# Patient Record
Sex: MALE | Race: WHITE | NOT HISPANIC OR LATINO | Employment: FULL TIME | URBAN - METROPOLITAN AREA
[De-identification: names, ages, dates, MRNs, and addresses within clinical notes are randomized per-mention and may not be internally consistent; named-entity substitution may affect disease eponyms.]

---

## 2018-12-20 ENCOUNTER — OFFICE VISIT (OUTPATIENT)
Dept: URGENT CARE | Facility: CLINIC | Age: 28
End: 2018-12-20
Payer: COMMERCIAL

## 2018-12-20 VITALS
BODY MASS INDEX: 30.79 KG/M2 | RESPIRATION RATE: 18 BRPM | DIASTOLIC BLOOD PRESSURE: 78 MMHG | HEART RATE: 93 BPM | HEIGHT: 75 IN | WEIGHT: 247.6 LBS | SYSTOLIC BLOOD PRESSURE: 133 MMHG | TEMPERATURE: 97 F | OXYGEN SATURATION: 95 %

## 2018-12-20 DIAGNOSIS — H66.92 LEFT OTITIS MEDIA, UNSPECIFIED OTITIS MEDIA TYPE: Primary | ICD-10-CM

## 2018-12-20 PROCEDURE — G0382 LEV 3 HOSP TYPE B ED VISIT: HCPCS | Performed by: PHYSICIAN ASSISTANT

## 2018-12-20 RX ORDER — AMOXICILLIN AND CLAVULANATE POTASSIUM 875; 125 MG/1; MG/1
1 TABLET, FILM COATED ORAL EVERY 12 HOURS SCHEDULED
Qty: 20 TABLET | Refills: 0 | Status: SHIPPED | OUTPATIENT
Start: 2018-12-20 | End: 2018-12-30

## 2018-12-20 NOTE — PROGRESS NOTES
[address]        NAME: Marylen Binet is a 34 y o  male  : 1990    MRN: [id number]  DATE: 2018  TIME: 5:06 PM    Assessment and Plan   Left otitis media, unspecified otitis media type [H66 92]  1  Left otitis media, unspecified otitis media type  amoxicillin-clavulanate (AUGMENTIN) 875-125 mg per tablet     Patient Instructions     Take antibiotic as prescribed  Follow up with PCP in 3-5 days  Proceed to  ER if symptoms worsen  Chief Complaint     Chief Complaint   Patient presents with   Loyce Mantis     left ear pain started this moring, also have cold like symptoms since Monday     History of Present Illness     Earache    There is pain in the left ear  This is a new problem  The current episode started today  The problem occurs constantly  The problem has been gradually worsening  The maximum temperature recorded prior to his arrival was 100 4 - 100 9 F  The pain is moderate  Associated symptoms include coughing, rhinorrhea and a sore throat  Pertinent negatives include no abdominal pain, diarrhea, ear discharge, headaches, hearing loss, neck pain, rash or vomiting  He has tried nothing for the symptoms  There is no history of a chronic ear infection, hearing loss or a tympanostomy tube  Review of Systems   Review of Systems   Constitutional: Negative for activity change, appetite change, chills, diaphoresis, fatigue, fever and unexpected weight change  HENT: Positive for congestion, ear pain, rhinorrhea and sore throat  Negative for dental problem, drooling, ear discharge, facial swelling and hearing loss  Respiratory: Positive for cough  Negative for apnea, choking, chest tightness, shortness of breath, wheezing and stridor  Gastrointestinal: Negative for abdominal pain, diarrhea and vomiting  Musculoskeletal: Negative for neck pain  Skin: Negative for rash  Neurological: Negative for headaches           Current Medications       Current Outpatient Prescriptions:     amoxicillin-clavulanate (AUGMENTIN) 875-125 mg per tablet, Take 1 tablet by mouth every 12 (twelve) hours for 10 days, Disp: 20 tablet, Rfl: 0    Current Allergies     Allergies as of 12/20/2018    (No Known Allergies)            The following portions of the patient's history were reviewed and updated as appropriate: allergies, current medications, past family history, past medical history, past social history, past surgical history and problem list      Past Medical History:   Diagnosis Date    Asthma        History reviewed  No pertinent surgical history  History reviewed  No pertinent family history  Medications have been verified  Objective   /78   Pulse 93   Temp (!) 97 °F (36 1 °C)   Resp 18   Ht 6' 3" (1 905 m)   Wt 112 kg (247 lb 9 6 oz)   SpO2 95%   BMI 30 95 kg/m²        Physical Exam     Physical Exam   Constitutional: He appears well-developed and well-nourished  HENT:   Head: Normocephalic  Right Ear: Hearing, tympanic membrane, external ear and ear canal normal  Tympanic membrane is not erythematous, not retracted and not bulging  Left Ear: Hearing, external ear and ear canal normal  Tympanic membrane is erythematous and bulging  Nose: Mucosal edema and rhinorrhea present  Mouth/Throat: Posterior oropharyngeal erythema present  No oropharyngeal exudate or posterior oropharyngeal edema  Cardiovascular: Normal rate, regular rhythm, normal heart sounds and intact distal pulses  Exam reveals no gallop and no friction rub  No murmur heard  Pulmonary/Chest: Effort normal and breath sounds normal  No respiratory distress  He has no decreased breath sounds  He has no wheezes  He has no rhonchi  He has no rales  Abdominal: Soft  Bowel sounds are normal  He exhibits no distension  There is no tenderness  There is no rebound and no guarding  Lymphadenopathy:     He has cervical adenopathy          Right cervical: Superficial cervical adenopathy present  Left cervical: Superficial cervical adenopathy present

## 2018-12-20 NOTE — LETTER
December 20, 2018     Patient: Eric Ramos   YOB: 1990   Date of Visit: 12/20/2018       To Whom it May Concern:    Eric Ramos was seen in my clinic on 12/20/2018  He may return to work on December 22, 2018  If you have any questions or concerns, please don't hesitate to call           Sincerely,          Cathleen Kraus PA-C        CC: No Recipients

## 2023-10-29 ENCOUNTER — HOSPITAL ENCOUNTER (EMERGENCY)
Facility: HOSPITAL | Age: 33
Discharge: HOME | End: 2023-10-29
Attending: EMERGENCY MEDICINE
Payer: COMMERCIAL

## 2023-10-29 ENCOUNTER — APPOINTMENT (EMERGENCY)
Dept: CT IMAGING | Facility: HOSPITAL | Age: 33
End: 2023-10-29
Payer: COMMERCIAL

## 2023-10-29 VITALS
WEIGHT: 225.97 LBS | SYSTOLIC BLOOD PRESSURE: 125 MMHG | RESPIRATION RATE: 18 BRPM | TEMPERATURE: 98.4 F | BODY MASS INDEX: 28.24 KG/M2 | DIASTOLIC BLOOD PRESSURE: 70 MMHG | HEART RATE: 95 BPM | OXYGEN SATURATION: 98 %

## 2023-10-29 DIAGNOSIS — K59.01 SLOW TRANSIT CONSTIPATION: Primary | ICD-10-CM

## 2023-10-29 LAB
ALBUMIN SERPL BCP-MCNC: 4.1 G/DL (ref 3.5–5)
ALP SERPL-CCNC: 86 U/L (ref 34–104)
ALT SERPL W P-5'-P-CCNC: 44 U/L (ref 7–52)
ANION GAP SERPL CALCULATED.3IONS-SCNC: 8 MMOL/L
AST SERPL W P-5'-P-CCNC: 24 U/L (ref 13–39)
BASOPHILS # BLD AUTO: 0.06 THOUSANDS/ÂΜL (ref 0–0.1)
BASOPHILS NFR BLD AUTO: 0 % (ref 0–1)
BILIRUB SERPL-MCNC: 0.51 MG/DL (ref 0.2–1)
BUN SERPL-MCNC: 17 MG/DL (ref 5–25)
CALCIUM SERPL-MCNC: 9.2 MG/DL (ref 8.4–10.2)
CHLORIDE SERPL-SCNC: 100 MMOL/L (ref 96–108)
CO2 SERPL-SCNC: 27 MMOL/L (ref 21–32)
CREAT SERPL-MCNC: 1.06 MG/DL (ref 0.6–1.3)
EOSINOPHIL # BLD AUTO: 0.26 THOUSAND/ÂΜL (ref 0–0.61)
EOSINOPHIL NFR BLD AUTO: 2 % (ref 0–6)
ERYTHROCYTE [DISTWIDTH] IN BLOOD BY AUTOMATED COUNT: 12.2 % (ref 11.6–15.1)
GFR SERPL CREATININE-BSD FRML MDRD: 93 ML/MIN/1.73SQ M
GLUCOSE SERPL-MCNC: 97 MG/DL (ref 65–140)
HCT VFR BLD AUTO: 40 % (ref 36.5–49.3)
HGB BLD-MCNC: 13 G/DL (ref 12–17)
IMM GRANULOCYTES # BLD AUTO: 0.1 THOUSAND/UL (ref 0–0.2)
IMM GRANULOCYTES NFR BLD AUTO: 1 % (ref 0–2)
LIPASE SERPL-CCNC: 8 U/L (ref 11–82)
LYMPHOCYTES # BLD AUTO: 2.9 THOUSANDS/ÂΜL (ref 0.6–4.47)
LYMPHOCYTES NFR BLD AUTO: 20 % (ref 14–44)
MCH RBC QN AUTO: 28.1 PG (ref 26.8–34.3)
MCHC RBC AUTO-ENTMCNC: 32.5 G/DL (ref 31.4–37.4)
MCV RBC AUTO: 86 FL (ref 82–98)
MONOCYTES # BLD AUTO: 1.22 THOUSAND/ÂΜL (ref 0.17–1.22)
MONOCYTES NFR BLD AUTO: 8 % (ref 4–12)
NEUTROPHILS # BLD AUTO: 10.37 THOUSANDS/ÂΜL (ref 1.85–7.62)
NEUTS SEG NFR BLD AUTO: 69 % (ref 43–75)
NRBC BLD AUTO-RTO: 0 /100 WBCS
PLATELET # BLD AUTO: 386 THOUSANDS/UL (ref 149–390)
PMV BLD AUTO: 9.1 FL (ref 8.9–12.7)
POTASSIUM SERPL-SCNC: 3.9 MMOL/L (ref 3.5–5.3)
PROT SERPL-MCNC: 7.8 G/DL (ref 6.4–8.4)
RBC # BLD AUTO: 4.63 MILLION/UL (ref 3.88–5.62)
SODIUM SERPL-SCNC: 135 MMOL/L (ref 135–147)
WBC # BLD AUTO: 14.91 THOUSAND/UL (ref 4.31–10.16)

## 2023-10-29 PROCEDURE — 36415 COLL VENOUS BLD VENIPUNCTURE: CPT | Performed by: EMERGENCY MEDICINE

## 2023-10-29 PROCEDURE — G1004 CDSM NDSC: HCPCS

## 2023-10-29 PROCEDURE — 80053 COMPREHEN METABOLIC PANEL: CPT | Performed by: EMERGENCY MEDICINE

## 2023-10-29 PROCEDURE — 74177 CT ABD & PELVIS W/CONTRAST: CPT

## 2023-10-29 PROCEDURE — 83690 ASSAY OF LIPASE: CPT | Performed by: EMERGENCY MEDICINE

## 2023-10-29 PROCEDURE — 96361 HYDRATE IV INFUSION ADD-ON: CPT

## 2023-10-29 PROCEDURE — 99285 EMERGENCY DEPT VISIT HI MDM: CPT | Performed by: EMERGENCY MEDICINE

## 2023-10-29 PROCEDURE — 85025 COMPLETE CBC W/AUTO DIFF WBC: CPT | Performed by: EMERGENCY MEDICINE

## 2023-10-29 PROCEDURE — 96374 THER/PROPH/DIAG INJ IV PUSH: CPT

## 2023-10-29 PROCEDURE — 99284 EMERGENCY DEPT VISIT MOD MDM: CPT

## 2023-10-29 RX ORDER — FAMOTIDINE 10 MG/ML
20 INJECTION, SOLUTION INTRAVENOUS ONCE
Status: COMPLETED | OUTPATIENT
Start: 2023-10-29 | End: 2023-10-29

## 2023-10-29 RX ORDER — POLYETHYLENE GLYCOL 3350 17 G/17G
17 POWDER, FOR SOLUTION ORAL DAILY
Qty: 510 G | Refills: 0 | Status: SHIPPED | OUTPATIENT
Start: 2023-10-29 | End: 2023-11-28

## 2023-10-29 RX ORDER — MAGNESIUM HYDROXIDE/ALUMINUM HYDROXICE/SIMETHICONE 120; 1200; 1200 MG/30ML; MG/30ML; MG/30ML
30 SUSPENSION ORAL ONCE
Status: COMPLETED | OUTPATIENT
Start: 2023-10-29 | End: 2023-10-29

## 2023-10-29 RX ORDER — METOCLOPRAMIDE 10 MG/1
10 TABLET ORAL EVERY 6 HOURS
Qty: 30 TABLET | Refills: 0 | Status: SHIPPED | OUTPATIENT
Start: 2023-10-29

## 2023-10-29 RX ADMIN — ALUMINUM HYDROXIDE, MAGNESIUM HYDROXIDE, AND DIMETHICONE 30 ML: 200; 20; 200 SUSPENSION ORAL at 19:31

## 2023-10-29 RX ADMIN — FAMOTIDINE 20 MG: 10 INJECTION, SOLUTION INTRAVENOUS at 19:31

## 2023-10-29 RX ADMIN — SODIUM CHLORIDE 1000 ML: 0.9 INJECTION, SOLUTION INTRAVENOUS at 19:32

## 2023-10-29 RX ADMIN — IOHEXOL 100 ML: 350 INJECTION, SOLUTION INTRAVENOUS at 20:20

## 2023-10-29 NOTE — ED PROVIDER NOTES
History  Chief Complaint   Patient presents with    Abdominal Pain     Started with abdominal pain yesterday, stated at times it goes into his back and feels like pressure. Stated has not been feeling well all week, has had a fever and diarrhea. Patient complains of 1-2 days of abdominal pain with some preceding diarrhea and subjective fevers. Points to the left lower quadrant. Some nausea but no vomiting. Patient states he uses occasional marijuana and methamphetamine and drinks approximately 25 drinks a week. History provided by:  Patient   used: No    Abdominal Pain  Pain location:  LLQ  Pain quality: aching and cramping    Pain radiates to:  Does not radiate  Pain severity:  Moderate  Onset quality:  Gradual  Duration:  2 days  Timing:  Constant  Progression:  Unchanged  Chronicity:  New  Relieved by:  Nothing  Worsened by:  Nothing  Ineffective treatments:  None tried  Associated symptoms: diarrhea, fever and nausea    Associated symptoms: no chest pain, no chills, no constipation, no cough, no dysuria, no hematemesis, no hematochezia, no hematuria, no melena, no shortness of breath, no sore throat and no vomiting        None       Past Medical History:   Diagnosis Date    Asthma        History reviewed. No pertinent surgical history. History reviewed. No pertinent family history. I have reviewed and agree with the history as documented. E-Cigarette/Vaping    E-Cigarette Use Never User      E-Cigarette/Vaping Substances    Nicotine No     THC No     CBD No     Flavoring No     Other No     Unknown No      Social History     Tobacco Use    Smoking status: Every Day     Packs/day: 0.25     Types: Cigarettes    Smokeless tobacco: Never   Vaping Use    Vaping Use: Never used   Substance Use Topics    Alcohol use: Yes     Comment: 25 beers a week    Drug use: Yes     Types: Marijuana, Methamphetamines       Review of Systems   Constitutional:  Positive for fever.  Negative for chills. HENT:  Negative for ear pain, hearing loss, sore throat, trouble swallowing and voice change. Eyes:  Negative for pain and discharge. Respiratory:  Negative for cough, shortness of breath and wheezing. Cardiovascular:  Negative for chest pain and palpitations. Gastrointestinal:  Positive for abdominal pain, diarrhea and nausea. Negative for blood in stool, constipation, hematemesis, hematochezia, melena and vomiting. Genitourinary:  Negative for dysuria, flank pain, frequency and hematuria. Musculoskeletal:  Negative for joint swelling, neck pain and neck stiffness. Skin:  Negative for rash and wound. Neurological:  Negative for dizziness, seizures, syncope, facial asymmetry and headaches. Psychiatric/Behavioral:  Negative for hallucinations, self-injury and suicidal ideas. All other systems reviewed and are negative. Physical Exam  Physical Exam  Vitals and nursing note reviewed. Constitutional:       General: He is not in acute distress. Appearance: He is well-developed. HENT:      Head: Normocephalic and atraumatic. Right Ear: External ear normal.      Left Ear: External ear normal.   Eyes:      General: No scleral icterus. Right eye: No discharge. Left eye: No discharge. Extraocular Movements: Extraocular movements intact. Conjunctiva/sclera: Conjunctivae normal.   Cardiovascular:      Rate and Rhythm: Normal rate and regular rhythm. Heart sounds: Normal heart sounds. No murmur heard. Pulmonary:      Effort: Pulmonary effort is normal.      Breath sounds: Normal breath sounds. No wheezing or rales. Abdominal:      General: Bowel sounds are normal. There is no distension. Palpations: Abdomen is soft. Tenderness: There is abdominal tenderness in the right upper quadrant. There is no guarding or rebound. Negative signs include Garcia's sign and McBurney's sign. Musculoskeletal:         General: No deformity.  Normal range of motion. Cervical back: Normal range of motion and neck supple. Skin:     General: Skin is warm and dry. Findings: No rash. Neurological:      General: No focal deficit present. Mental Status: He is alert and oriented to person, place, and time. Cranial Nerves: No cranial nerve deficit. Psychiatric:         Mood and Affect: Mood normal.         Behavior: Behavior normal.         Thought Content:  Thought content normal.         Judgment: Judgment normal.         Vital Signs  ED Triage Vitals [10/29/23 1921]   Temperature Pulse Respirations Blood Pressure SpO2   98.4 °F (36.9 °C) 95 18 125/70 98 %      Temp Source Heart Rate Source Patient Position - Orthostatic VS BP Location FiO2 (%)   Temporal Monitor Sitting Right arm --      Pain Score       6           Vitals:    10/29/23 1921   BP: 125/70   Pulse: 95   Patient Position - Orthostatic VS: Sitting         Visual Acuity      ED Medications  Medications   sodium chloride 0.9 % bolus 1,000 mL (1,000 mL Intravenous New Bag 10/29/23 1932)   Famotidine (PF) (PEPCID) injection 20 mg (20 mg Intravenous Given 10/29/23 1931)   aluminum-magnesium hydroxide-simethicone (MAALOX) oral suspension 30 mL (30 mL Oral Given 10/29/23 1931)   iohexol (OMNIPAQUE) 350 MG/ML injection (MULTI-DOSE) 100 mL (100 mL Intravenous Given 10/29/23 2020)       Diagnostic Studies  Results Reviewed       Procedure Component Value Units Date/Time    Comprehensive metabolic panel [[id number]] Collected: 10/29/23 1932    Lab Status: Final result Specimen: Blood from Arm, Left Updated: 10/29/23 2000     Sodium 135 mmol/L      Potassium 3.9 mmol/L      Chloride 100 mmol/L      CO2 27 mmol/L      ANION GAP 8 mmol/L      BUN 17 mg/dL      Creatinine 1.06 mg/dL      Glucose 97 mg/dL      Calcium 9.2 mg/dL      AST 24 U/L      ALT 44 U/L      Alkaline Phosphatase 86 U/L      Total Protein 7.8 g/dL      Albumin 4.1 g/dL      Total Bilirubin 0.51 mg/dL      eGFR 93 ml/min/1.73sq m Narrative:      National Kid[employer] Foundation guidelines for Chronic Kidney Disease (CKD):     Stage 1 with normal or high GFR (GFR > 90 mL/min/1.73 square meters)    Stage 2 Mild CKD (GFR = 60-89 mL/min/1.73 square meters)    Stage 3A Moderate CKD (GFR = 45-59 mL/min/1.73 square meters)    Stage 3B Moderate CKD (GFR = 30-44 mL/min/1.73 square meters)    Stage 4 Severe CKD (GFR = 15-29 mL/min/1.73 square meters)    Stage 5 End Stage CKD (GFR <15 mL/min/1.73 square meters)  Note: GFR calculation is accurate only with a steady state creatinine    Lipase [[id number]]  (Abnormal) Collected: 10/29/23 1932    Lab Status: Final result Specimen: Blood from Arm, Left Updated: 10/29/23 2000     Lipase 8 u/L     CBC and differential [[id number]]  (Abnormal) Collected: 10/29/23 1932    Lab Status: Final result Specimen: Blood from Arm, Left Updated: 10/29/23 1939     WBC 14.91 Thousand/uL      RBC 4.63 Million/uL      Hemoglobin 13.0 g/dL      Hematocrit 40.0 %      MCV 86 fL      MCH 28.1 pg      MCHC 32.5 g/dL      RDW 12.2 %      MPV 9.1 fL      Platelets 521 Thousands/uL      nRBC 0 /100 WBCs      Neutrophils Relative 69 %      Immat GRANS % 1 %      Lymphocytes Relative 20 %      Monocytes Relative 8 %      Eosinophils Relative 2 %      Basophils Relative 0 %      Neutrophils Absolute 10.37 Thousands/µL      Immature Grans Absolute 0.10 Thousand/uL      Lymphocytes Absolute 2.90 Thousands/µL      Monocytes Absolute 1.22 Thousand/µL      Eosinophils Absolute 0.26 Thousand/µL      Basophils Absolute 0.06 Thousands/µL                    CT abdomen pelvis w contrast   Final Result by Bianca Haddad MD (10/29 2205)      No evidence of acute abnormality in the abdomen or pelvis. Fecalized contents in scattered small bowel loops suggesting slow transit. No evidence of bowel obstruction.       I personally discussed this study with Tony Zayas on 10/29/2023 10:03 PM.            Workstation performed: [id number] Procedures  Procedures         ED Course                               SBIRT 22yo+      Flowsheet Row Most Recent Value   Initial Alcohol Screen: US AUDIT-C     1. How often do you have a drink containing alcohol? 0 Filed at: 10/29/2023 1923   2. How many drinks containing alcohol do you have on a typical day you are drinking? 0 Filed at: 10/29/2023 1923   3a. Male UNDER 65: How often do you have five or more drinks on one occasion? 0 Filed at: 10/29/2023 1923   3b. FEMALE Any Age, or MALE 65+: How often do you have 4 or more drinks on one occassion? 0 Filed at: 10/29/2023 1923   Audit-C Score 0 Filed at: 10/29/2023 8427                      Medical Decision Making  Based on the history and medical screening exam performed the diagnostic considerations include but are not limited to gastroenteritis, colitis, diverticulitis, muscular abdominal pain, constipation. Based on the work-up performed in the emergency room which includes physical examination, and which may include laboratory studies and imaging as warranted including advanced imaging such as CT scan or ultrasound, the differential diagnosis is narrowed to exclude limb or life-threatening process. The patient is stable for discharge. Lab work is fairly benign but CT abdomen pelvis reveals significant stool burden including through the right colon and parts of the small bowel. This finding has been discussed with the patient and he has been prescribed Reglan and MiraLAX and advised to follow-up with gastroenterology. Outpatient follow-up information for gastroenterology provided    Amount and/or Complexity of Data Reviewed  Labs: ordered. Decision-making details documented in ED Course. Details: White count 14 otherwise normal  Radiology: ordered and independent interpretation performed. Decision-making details documented in ED Course.      Details: Findings consistent with constipation and fecalization in the right colon and parts of the small bowel    Risk  OTC drugs. Prescription drug management. Disposition  Final diagnoses:   Slow transit constipation     Time reflects when diagnosis was documented in both MDM as applicable and the Disposition within this note       Time User Action Codes Description Comment    10/29/2023 10:05 PM Jamir Herman Add [K59.01] Slow transit constipation           ED Disposition       ED Disposition   Discharge    Condition   Stable    Date/Time   Sun Oct 29, 2023 2205    [id number] [facility] discharge to home/self care. Follow-up Information       Follow up With Specialties Details Why Contact Info    Serenity Parikh DO [facility]   [address] [place] [address]  [phone]      Gonzalez Curtis MD Gastroenterology In 2 days  [address] [facility]  [phone]              Patient's Medications   Discharge Prescriptions    METOCLOPRAMIDE (REGLAN) 10 MG TABLET    Take 1 tablet (10 mg total) by mouth every 6 (six) hours       Start Date: 10/29/2023End Date: --       Order Dose: 10 mg       Quantity: 30 tablet    Refills: 0    POLYETHYLENE GLYCOL (MIRALAX) 17 G PACKET    Take 17 g by mouth daily       Start Date: 10/29/2023End Date: 11/28/2023       Order Dose: 17 g       Quantity: 510 g    Refills: 0       No discharge procedures on file.     PDMP Review       None            ED Provider  Electronically Signed by             Jamir Herman MD  10/29/23 5738

## 2023-10-30 NOTE — DISCHARGE INSTRUCTIONS
The CT scan performed today reveals significant amount of stool burden throughout the colon including the right colon and parts of the small bowel. It is important that you schedule follow-up with the GI doctor listed below or the GI doctor of your choice. Please begin taking the prescribed medications which have been sent to your pharmacy.